# Patient Record
Sex: FEMALE | Race: BLACK OR AFRICAN AMERICAN | Employment: UNEMPLOYED | URBAN - METROPOLITAN AREA
[De-identification: names, ages, dates, MRNs, and addresses within clinical notes are randomized per-mention and may not be internally consistent; named-entity substitution may affect disease eponyms.]

---

## 2024-10-24 ENCOUNTER — APPOINTMENT (OUTPATIENT)
Dept: CT IMAGING | Facility: HOSPITAL | Age: 29
End: 2024-10-24
Attending: EMERGENCY MEDICINE

## 2024-10-24 ENCOUNTER — HOSPITAL ENCOUNTER (EMERGENCY)
Facility: HOSPITAL | Age: 29
Discharge: HOME | End: 2024-10-24
Attending: EMERGENCY MEDICINE

## 2024-10-24 ENCOUNTER — TELEPHONE (OUTPATIENT)
Dept: NEUROLOGY | Facility: CLINIC | Age: 29
End: 2024-10-24

## 2024-10-24 VITALS
RESPIRATION RATE: 17 BRPM | BODY MASS INDEX: 36.8 KG/M2 | HEART RATE: 69 BPM | TEMPERATURE: 98 F | HEIGHT: 62 IN | DIASTOLIC BLOOD PRESSURE: 73 MMHG | WEIGHT: 200 LBS | SYSTOLIC BLOOD PRESSURE: 108 MMHG | OXYGEN SATURATION: 99 %

## 2024-10-24 DIAGNOSIS — G40.909 SEIZURE DISORDER (HCC): Primary | ICD-10-CM

## 2024-10-24 LAB
ALBUMIN SERPL-MCNC: 5.1 G/DL (ref 3.2–4.8)
ALBUMIN/GLOB SERPL: 1.8 {RATIO} (ref 1–2)
ALP LIVER SERPL-CCNC: 75 U/L
ALT SERPL-CCNC: 16 U/L
ANION GAP SERPL CALC-SCNC: 11 MMOL/L (ref 0–18)
AST SERPL-CCNC: 16 U/L (ref ?–34)
BASOPHILS # BLD AUTO: 0.03 X10(3) UL (ref 0–0.2)
BASOPHILS NFR BLD AUTO: 0.4 %
BILIRUB SERPL-MCNC: 0.7 MG/DL (ref 0.3–1.2)
BUN BLD-MCNC: 13 MG/DL (ref 9–23)
CALCIUM BLD-MCNC: 9.7 MG/DL (ref 8.7–10.4)
CHLORIDE SERPL-SCNC: 107 MMOL/L (ref 98–112)
CO2 SERPL-SCNC: 23 MMOL/L (ref 21–32)
CREAT BLD-MCNC: 0.88 MG/DL
EGFRCR SERPLBLD CKD-EPI 2021: 91 ML/MIN/1.73M2 (ref 60–?)
EOSINOPHIL # BLD AUTO: 0.07 X10(3) UL (ref 0–0.7)
EOSINOPHIL NFR BLD AUTO: 1 %
ERYTHROCYTE [DISTWIDTH] IN BLOOD BY AUTOMATED COUNT: 11.9 %
GLOBULIN PLAS-MCNC: 2.8 G/DL (ref 2–3.5)
GLUCOSE BLD-MCNC: 102 MG/DL (ref 70–99)
HCT VFR BLD AUTO: 42.5 %
HGB BLD-MCNC: 14.2 G/DL
IMM GRANULOCYTES # BLD AUTO: 0.01 X10(3) UL (ref 0–1)
IMM GRANULOCYTES NFR BLD: 0.1 %
LYMPHOCYTES # BLD AUTO: 2.55 X10(3) UL (ref 1–4)
LYMPHOCYTES NFR BLD AUTO: 36.4 %
MCH RBC QN AUTO: 30.1 PG (ref 26–34)
MCHC RBC AUTO-ENTMCNC: 33.4 G/DL (ref 31–37)
MCV RBC AUTO: 90 FL
MONOCYTES # BLD AUTO: 0.4 X10(3) UL (ref 0.1–1)
MONOCYTES NFR BLD AUTO: 5.7 %
NEUTROPHILS # BLD AUTO: 3.94 X10 (3) UL (ref 1.5–7.7)
NEUTROPHILS # BLD AUTO: 3.94 X10(3) UL (ref 1.5–7.7)
NEUTROPHILS NFR BLD AUTO: 56.4 %
OSMOLALITY SERPL CALC.SUM OF ELEC: 292 MOSM/KG (ref 275–295)
PLATELET # BLD AUTO: 254 10(3)UL (ref 150–450)
POTASSIUM SERPL-SCNC: 3.9 MMOL/L (ref 3.5–5.1)
PROT SERPL-MCNC: 7.9 G/DL (ref 5.7–8.2)
RBC # BLD AUTO: 4.72 X10(6)UL
SODIUM SERPL-SCNC: 141 MMOL/L (ref 136–145)
WBC # BLD AUTO: 7 X10(3) UL (ref 4–11)

## 2024-10-24 PROCEDURE — 70450 CT HEAD/BRAIN W/O DYE: CPT | Performed by: EMERGENCY MEDICINE

## 2024-10-24 PROCEDURE — 99285 EMERGENCY DEPT VISIT HI MDM: CPT

## 2024-10-24 PROCEDURE — 80053 COMPREHEN METABOLIC PANEL: CPT | Performed by: EMERGENCY MEDICINE

## 2024-10-24 PROCEDURE — 85025 COMPLETE CBC W/AUTO DIFF WBC: CPT | Performed by: EMERGENCY MEDICINE

## 2024-10-24 PROCEDURE — 99284 EMERGENCY DEPT VISIT MOD MDM: CPT

## 2024-10-24 PROCEDURE — 96374 THER/PROPH/DIAG INJ IV PUSH: CPT

## 2024-10-24 PROCEDURE — 96375 TX/PRO/DX INJ NEW DRUG ADDON: CPT

## 2024-10-24 RX ORDER — LEVETIRACETAM 500 MG/1
500 TABLET ORAL 2 TIMES DAILY
Qty: 60 TABLET | Refills: 0 | Status: SHIPPED | OUTPATIENT
Start: 2024-10-24 | End: 2024-11-23

## 2024-10-24 RX ORDER — LORAZEPAM 2 MG/ML
1 INJECTION INTRAMUSCULAR ONCE
Status: COMPLETED | OUTPATIENT
Start: 2024-10-24 | End: 2024-10-24

## 2024-10-24 RX ORDER — LEVETIRACETAM 500 MG/5ML
1000 INJECTION, SOLUTION, CONCENTRATE INTRAVENOUS ONCE
Status: COMPLETED | OUTPATIENT
Start: 2024-10-24 | End: 2024-10-24

## 2024-10-24 NOTE — TELEPHONE ENCOUNTER
SEIZURE CLINIC SCREENING    1.         Date of ED discharge:  10/24/2024    2.         Is patient currently admitted?  No              (if YES - Seizure Clinic Appointment not required.  Patient should follow usual non-urgent scheduling procedures to see neurology upon discharge from hospital.)    2.         Does the patient already have a non-[facility] neurologist (seen in past 3 years, check care everywhere and ask the patient)?  No  Name:                (if YES - Seizure Clinic Appointment not required.  Patient should be advised to contact their neurologist.)    3.         Does patient already see an [facility] neurologist (seen in past 3 years)?  No  Name:                (if YES - Seizure Clinic Appointment not required.  Route message on to patient's [facility] neurologist.)    4.         Is Seizure Clinic Appointment indicated?  Yes    If YES - route encounter to [facility] to schedule patient for clinic appointment, new patient visit, NO LATER THAN 72 HOURS AFTER DISCHARGE.

## 2024-10-24 NOTE — ED QUICK NOTES
Pt's side rails padded for precaution, pt awake and alert. A little slow to answer questions. coherent   retired

## 2024-10-24 NOTE — ED QUICK NOTES
Upon discharge, pt's mother requested a ct braintesting, dr. Renee at bedside  and ordered the test

## 2024-10-24 NOTE — ED INITIAL ASSESSMENT (HPI)
Pt with hx of seizures per medics, witnessed by mother. Per medics pt had a seizure lasted for 2 minutes per mother. Pt was in her bed during seizure activitiy. Per pt she last took her seizure meds a year ago

## 2024-10-24 NOTE — ED QUICK NOTES
Pt reevaluated by dr. Renee, pt and mother both informed of her test report and plan of care. Verbalizing understanding

## 2024-10-24 NOTE — ED QUICK NOTES
Mother at bedside. Pt states that she feels like she'll have another seizures. Dr. Renee made aware

## 2024-10-24 NOTE — ED PROVIDER NOTES
Patient Seen in: [facility] Emergency Department      History     Chief Complaint   Patient presents with    Seizure Disorder     Stated Complaint: SEIZURE    Subjective:   HPI      29-year-old female with history of epilepsy presents after a witnessed seizure at home.  Paramedics report that mom witnessed the seizure and said it lasted about 2 minutes.  Paramedics state the patient recently relocated to this area.  The patient tells me that she has not taken her medication about a year.  She used to take Keppra but she is uncertain of what dosage.  She does not follow with neurology any longer.  Previously treated in [place].  Denies any injuries    Objective:     Past Medical History:    Seizure disorder (HCC)              History reviewed. No pertinent surgical history.             No pertinent social history.                Physical Exam     ED Triage Vitals [10/24/24 0733]   /67   Pulse 102   Resp 20   Temp 98 °F (36.7 °C)   Temp src Temporal   SpO2 99 %   O2 Device None (Room air)       Current Vitals:   Vital Signs  BP: 114/60  Pulse: 73  Resp: 20  Temp: 98 °F (36.7 °C)  Temp src: Temporal  MAP (mmHg): 86    Oxygen Therapy  SpO2: 98 %  O2 Device: None (Room air)        Physical Exam  General:  Vitals as listed.  No acute distress   HEENT:  puncture wound to the left front tongue region.  No laceration.  No oral bleeding.  Neck: Full range of motion without discomfort  Lungs: good air exchange and clear   Heart: regular rate rhythm and no murmur   Abdomen: Soft and nontender.  No abdominal masses.  No peritoneal signs   Extremities: no edema, normal peripheral pulses   Neuro: Alert oriented and nonfocal       ED Course     Labs Reviewed   COMP METABOLIC PANEL (14) - Abnormal; Notable for the following components:       Result Value    Glucose 102 (*)     Albumin 5.1 (*)     All other components within normal limits   CBC WITH DIFFERENTIAL WITH PLATELET   RAINBOW DRAW LAVENDER   RAINBOW DRAW LIGHT  GREEN   RAINBOW DRAW BLUE   RAINBOW DRAW GOLD          CT BRAIN OR HEAD (CPT=70450)    Result Date: 10/24/2024  PROCEDURE:  CT BRAIN OR HEAD (63935)  COMPARISON:  None.  INDICATIONS:  SEIZURE  TECHNIQUE:  Noncontrast CT scanning is performed through the brain. Dose reduction techniques were used. Dose information is transmitted to the ACR (American College of Radiology) NRDR (National Radiology Data Registry) which includes the Dose Index Registry.  PATIENT STATED HISTORY: (As transcribed by Technologist)  Patient stated she had a seziure today.    FINDINGS:  VENTRICLES/SULCI:  Ventricles and sulci are normal in size.  INTRACRANIAL:  There is asymmetry of the cerebellum with the right cerebellum being hypoplastic.  There are no abnormal extraaxial fluid collections.  There is no midline shift.  There are no intraparenchymal brain abnormalities.  There is nothing specific for acute infarct.  There is no hemorrhage or mass lesion.  SINUSES:           No sign of acute sinusitis.  MASTOIDS:          No sign of acute inflammation. SKULL:             No evidence for fracture or osseous abnormality. OTHER:             None.            CONCLUSION:   1.  No acute intracranial process.   2.  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  Given the patient's history of seizure, further evaluation could be performed using seizure protocol MRI.    LOCATION:  [facility]   Dictated by (CST): Magno Cruz MD on 10/24/2024 at 12:32 PM     Finalized by (CST): Magno Cruz MD on 10/24/2024 at 12:34 PM               MDM      29-year-old female presents after a witnessed seizure at home.  Previously on Keppra (unknown dose) small wound to the left side of the tongue.  No bleeding    Additional history obtained by patient's mom who is now at the bedside and reports that the patient also had a seizure last night.  She last took Keppra about a year ago and was on 250 mg twice daily.  Prior to that she had been on  Tegretol (?) and was too fatigued on that so she was switched to Keppra and ultimately titrated from 500 mg to 250 mg due to side effects of fatigue    Differential includes but is not limited to tongue injury, seizure, epilepsy, a life threat.    CBC, CMP ordered for further evaluation.    Minor visual interpretation of CT the brain is that there is no acute intracranial hemorrhage.    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    Discussed the patient's workup with neurology who would like to follow-up in clinic.  Outpatient MRI ordered per seizure clinic follow-up protocol    No significant abnormalities on laboratory evaluation.  Patient began reporting she felt like she might have another seizure so Ativan 1 mg IV was given.  She then slept for a while.  Mom showed up and states the patient was most recently on Keppra 250 mg twice daily due to being very tired on higher doses.  I discussed this case with Dr. Kendrick from neurology who recommends a 1 g Keppra IV bolus and that the patient be started on 500 mg twice daily of Keppra and follow-up in the epilepsy clinic.  Patient okay for discharge home at this time.  Will send prescription for oral medication to pharmacy and instructed to call to set up management with neurology.            Medical Decision Making      Disposition and Plan     Clinical Impression:  1. Seizure disorder (HCC)         Disposition:  Discharge  10/24/2024 11:00 am    Follow-up:  [address]  [address] [address] [phone]  Call   An EEG and MRI have been ordered. Call office and choose option 1 for general neurology and state that you are following up for Seizure          Medications Prescribed:  Current Discharge Medication List        START taking these medications    Details   levETIRAcetam 500 MG Oral Tab Take 1 tablet (500 mg total) by mouth 2 (two) times  daily.  Qty: 60 tablet, Refills: 0                 Supplementary Documentation:

## 2024-10-24 NOTE — TELEPHONE ENCOUNTER
Contacted patient and scheduled first available seizure clinic spot that worked for her. Please let us know if he would like to see her sooner.    Future Appointments   Date Time Provider Department Center   11/13/2024  9:00 AM Bonnie Nelson MD [facility]

## 2024-11-12 NOTE — TELEPHONE ENCOUNTER
Patients mother calling to request a different appt time if possible. Patient was scheduled for seizure clinic spot tomorrow 11/13 and her mom has a biopsy at the same time. Pulled up  schedule and the next slot is in January- is there a different slot we can offer if he would like to see her sooner?

## 2024-11-26 ENCOUNTER — NURSE TRIAGE (OUTPATIENT)
Dept: TELEHEALTH | Age: 29
End: 2024-11-26

## 2024-11-27 NOTE — TELEPHONE ENCOUNTER
Third attempt to contact patient. Lvm to call back if still interested in booking an appointment.

## 2024-11-29 ENCOUNTER — OFFICE VISIT (OUTPATIENT)
Dept: INTERNAL MEDICINE | Age: 29
End: 2024-11-29

## 2024-11-29 VITALS
HEIGHT: 62 IN | SYSTOLIC BLOOD PRESSURE: 121 MMHG | DIASTOLIC BLOOD PRESSURE: 68 MMHG | BODY MASS INDEX: 36.55 KG/M2 | HEART RATE: 76 BPM | WEIGHT: 198.6 LBS | RESPIRATION RATE: 16 BRPM

## 2024-11-29 DIAGNOSIS — G40.909 SEIZURE DISORDER  (CMD): Primary | ICD-10-CM

## 2024-11-29 DIAGNOSIS — I83.813 VARICOSE VEINS OF BOTH LOWER EXTREMITIES WITH PAIN: ICD-10-CM

## 2024-11-29 DIAGNOSIS — E28.2 PCOS (POLYCYSTIC OVARIAN SYNDROME): ICD-10-CM

## 2024-11-29 RX ORDER — LEVETIRACETAM 500 MG/1
500 TABLET ORAL 2 TIMES DAILY
COMMUNITY
Start: 2024-11-26

## 2024-11-29 ASSESSMENT — ENCOUNTER SYMPTOMS
DIZZINESS: 0
SHORTNESS OF BREATH: 0
COUGH: 0
CHILLS: 0
ACTIVITY CHANGE: 0
HEADACHES: 0
FEVER: 0
SLEEP DISTURBANCE: 0
APPETITE CHANGE: 0
UNEXPECTED WEIGHT CHANGE: 0
NERVOUS/ANXIOUS: 0
FATIGUE: 0
DIAPHORESIS: 0

## 2024-11-29 ASSESSMENT — PATIENT HEALTH QUESTIONNAIRE - PHQ9
2. FEELING DOWN, DEPRESSED OR HOPELESS: MORE THAN HALF THE DAYS
8. MOVING OR SPEAKING SO SLOWLY THAT OTHER PEOPLE COULD HAVE NOTICED. OR THE OPPOSITE, BEING SO FIGETY OR RESTLESS THAT YOU HAVE BEEN MOVING AROUND A LOT MORE THAN USUAL: MORE THAN HALF THE DAYS
9. THOUGHTS THAT YOU WOULD BE BETTER OFF DEAD, OR OF HURTING YOURSELF: NOT AT ALL
1. LITTLE INTEREST OR PLEASURE IN DOING THINGS: MORE THAN HALF THE DAYS
7. TROUBLE CONCENTRATING ON THINGS, SUCH AS READING THE NEWSPAPER OR WATCHING TELEVISION: MORE THAN HALF THE DAYS
3. TROUBLE FALLING OR STAYING ASLEEP OR SLEEPING TOO MUCH: MORE THAN HALF THE DAYS
6. FEELING BAD ABOUT YOURSELF - OR THAT YOU ARE A FAILURE OR HAVE LET YOURSELF OR YOUR FAMILY DOWN: NOT AT ALL
SUM OF ALL RESPONSES TO PHQ9 QUESTIONS 1 AND 2: 4
SUM OF ALL RESPONSES TO PHQ9 QUESTIONS 1 AND 2: 4
CLINICAL INTERPRETATION OF PHQ2 SCORE: FURTHER SCREENING NEEDED
5. POOR APPETITE, WEIGHT LOSS, OR OVEREATING: MORE THAN HALF THE DAYS
4. FEELING TIRED OR HAVING LITTLE ENERGY: MORE THAN HALF THE DAYS
SUM OF ALL RESPONSES TO PHQ QUESTIONS 1-9: 14
CLINICAL INTERPRETATION OF PHQ9 SCORE: MODERATE DEPRESSION
10. IF YOU CHECKED OFF ANY PROBLEMS, HOW DIFFICULT HAVE THESE PROBLEMS MADE IT FOR YOU TO DO YOUR WORK, TAKE CARE OF THINGS AT HOME, OR GET ALONG WITH OTHER PEOPLE: SOMEWHAT DIFFICULT

## 2024-12-05 LAB
CYTOLOGY CVX/VAG DOC THIN PREP: NORMAL
HPV16+18+45 E6+E7MRNA CVX NAA+PROBE: NEGATIVE

## 2024-12-23 RX ORDER — LEVETIRACETAM 500 MG/1
500 TABLET ORAL 2 TIMES DAILY
Qty: 60 TABLET | Refills: 0 | Status: SHIPPED | OUTPATIENT
Start: 2024-12-23

## 2024-12-23 RX ORDER — LEVETIRACETAM 500 MG/1
500 TABLET ORAL 2 TIMES DAILY
Qty: 60 TABLET | Refills: 0 | Status: CANCELLED | OUTPATIENT
Start: 2024-12-23

## 2025-01-20 RX ORDER — LEVETIRACETAM 500 MG/1
500 TABLET ORAL 2 TIMES DAILY
Qty: 60 TABLET | Refills: 0 | Status: SHIPPED | OUTPATIENT
Start: 2025-01-20

## 2025-01-22 ENCOUNTER — TELEPHONE (OUTPATIENT)
Dept: NEUROLOGY | Age: 30
End: 2025-01-22

## 2025-01-29 ENCOUNTER — APPOINTMENT (OUTPATIENT)
Dept: NEUROLOGY | Age: 30
End: 2025-01-29

## 2025-01-29 VITALS
WEIGHT: 201 LBS | HEIGHT: 62 IN | OXYGEN SATURATION: 97 % | DIASTOLIC BLOOD PRESSURE: 73 MMHG | SYSTOLIC BLOOD PRESSURE: 114 MMHG | BODY MASS INDEX: 36.99 KG/M2 | HEART RATE: 71 BPM

## 2025-01-29 DIAGNOSIS — F44.9 CONVERSION DISORDER: ICD-10-CM

## 2025-01-29 DIAGNOSIS — G47.30 SLEEP APNEA, UNSPECIFIED TYPE: ICD-10-CM

## 2025-01-29 DIAGNOSIS — G40.209 FOCAL EPILEPSY WITH IMPAIRMENT OF CONSCIOUSNESS  (CMD): Primary | ICD-10-CM

## 2025-01-29 DIAGNOSIS — F41.9 ANXIETY: ICD-10-CM

## 2025-01-29 PROCEDURE — 99205 OFFICE O/P NEW HI 60 MIN: CPT | Performed by: STUDENT IN AN ORGANIZED HEALTH CARE EDUCATION/TRAINING PROGRAM

## 2025-01-29 RX ORDER — LEVETIRACETAM 500 MG/1
500 TABLET ORAL 2 TIMES DAILY
Qty: 180 TABLET | Refills: 1 | Status: SHIPPED | OUTPATIENT
Start: 2025-01-29

## 2025-02-04 ENCOUNTER — E-ADVICE (OUTPATIENT)
Dept: SLEEP MEDICINE | Age: 30
End: 2025-02-04

## 2025-03-03 ENCOUNTER — TELEPHONE (OUTPATIENT)
Age: 30
End: 2025-03-03

## 2025-03-17 ENCOUNTER — CLINICAL ABSTRACT (OUTPATIENT)
Dept: OTHER | Age: 30
End: 2025-03-17

## 2025-04-23 ENCOUNTER — TELEPHONE (OUTPATIENT)
Dept: NEUROLOGY | Age: 30
End: 2025-04-23

## 2025-05-02 ENCOUNTER — TELEPHONE (OUTPATIENT)
Dept: NEUROLOGY | Age: 30
End: 2025-05-02

## 2025-05-02 ENCOUNTER — E-ADVICE (OUTPATIENT)
Dept: NEUROLOGY | Age: 30
End: 2025-05-02

## 2025-05-08 ENCOUNTER — APPOINTMENT (OUTPATIENT)
Dept: NEUROLOGY | Age: 30
End: 2025-05-08

## 2025-05-08 VITALS
BODY MASS INDEX: 36.99 KG/M2 | OXYGEN SATURATION: 98 % | HEIGHT: 62 IN | SYSTOLIC BLOOD PRESSURE: 112 MMHG | HEART RATE: 63 BPM | WEIGHT: 201 LBS | DIASTOLIC BLOOD PRESSURE: 79 MMHG

## 2025-05-08 DIAGNOSIS — F41.9 ANXIETY: ICD-10-CM

## 2025-05-08 DIAGNOSIS — F44.9 CONVERSION DISORDER: ICD-10-CM

## 2025-05-08 DIAGNOSIS — T88.7XXA MEDICATION SIDE EFFECTS: ICD-10-CM

## 2025-05-08 DIAGNOSIS — G47.30 SLEEP APNEA, UNSPECIFIED TYPE: ICD-10-CM

## 2025-05-08 DIAGNOSIS — G40.209 FOCAL EPILEPSY WITH IMPAIRMENT OF CONSCIOUSNESS (CMD): Primary | ICD-10-CM

## 2025-05-08 PROCEDURE — 99214 OFFICE O/P EST MOD 30 MIN: CPT | Performed by: STUDENT IN AN ORGANIZED HEALTH CARE EDUCATION/TRAINING PROGRAM

## 2025-05-08 RX ORDER — LEVETIRACETAM 500 MG/1
500 TABLET ORAL 2 TIMES DAILY
Qty: 180 TABLET | Refills: 1 | Status: SHIPPED | OUTPATIENT
Start: 2025-05-08

## 2025-05-08 RX ORDER — MULTIVITAMIN WITH IRON
100 TABLET ORAL DAILY
Qty: 30 TABLET | Refills: 3 | Status: SHIPPED | OUTPATIENT
Start: 2025-05-08

## 2025-05-08 ASSESSMENT — ANXIETY QUESTIONNAIRES
3. WORRYING TOO MUCH ABOUT DIFFERENT THINGS: 2
5. BEING SO RESTLESS THAT IT IS HARD TO SIT STILL: SEVERAL DAYS
1. FEELING NERVOUS, ANXIOUS, OR ON EDGE: SEVERAL DAYS
4. TROUBLE RELAXING: 2
6. BECOMING EASILY ANNOYED OR IRRITABLE: 3
GAD7 TOTAL SCORE: 10
7. FEELING AFRAID AS IF SOMETHING AWFUL MIGHT HAPPEN: 0
1. FEELING NERVOUS, ANXIOUS, OR ON EDGE: 1
5. BEING SO RESTLESS THAT IT IS HARD TO SIT STILL: 1
3. WORRYING TOO MUCH ABOUT DIFFERENT THINGS: MORE THAN HALF THE DAYS
6. BECOMING EASILY ANNOYED OR IRRITABLE: NEARLY EVERY DAY
7. FEELING AFRAID AS IF SOMETHING AWFUL MIGHT HAPPEN: NOT AT ALL
2. NOT BEING ABLE TO STOP OR CONTROL WORRYING: SEVERAL DAYS
4. TROUBLE RELAXING: MORE THAN HALF THE DAYS
2. NOT BEING ABLE TO STOP OR CONTROL WORRYING: 1
IF YOU CHECKED OFF ANY PROBLEMS ON THIS QUESTIONNAIRE, HOW DIFFICULT HAVE THESE PROBLEMS MADE IT FOR YOU TO DO YOUR WORK, TAKE CARE OF THINGS AT HOME, OR GET ALONG WITH OTHER PEOPLE: NOT DIFFICULT AT ALL

## 2025-05-08 ASSESSMENT — PAIN SCALES - GENERAL: PAINLEVEL_OUTOF10: 0

## 2025-07-28 ENCOUNTER — TELEPHONE (OUTPATIENT)
Dept: NEUROLOGY | Age: 30
End: 2025-07-28

## 2025-07-30 ENCOUNTER — E-ADVICE (OUTPATIENT)
Dept: INTERNAL MEDICINE | Age: 30
End: 2025-07-30

## 2025-08-13 ENCOUNTER — APPOINTMENT (OUTPATIENT)
Dept: NEUROLOGY | Age: 30
End: 2025-08-13

## 2025-08-28 ENCOUNTER — APPOINTMENT (OUTPATIENT)
Dept: FAMILY MEDICINE | Age: 30
End: 2025-08-28

## 2025-08-28 VITALS
TEMPERATURE: 97.2 F | BODY MASS INDEX: 35.54 KG/M2 | SYSTOLIC BLOOD PRESSURE: 110 MMHG | RESPIRATION RATE: 16 BRPM | HEART RATE: 68 BPM | DIASTOLIC BLOOD PRESSURE: 64 MMHG | OXYGEN SATURATION: 99 % | WEIGHT: 193.12 LBS | HEIGHT: 62 IN

## 2025-08-28 DIAGNOSIS — F41.1 GAD (GENERALIZED ANXIETY DISORDER): ICD-10-CM

## 2025-08-28 DIAGNOSIS — E55.9 VITAMIN D DEFICIENCY: ICD-10-CM

## 2025-08-28 DIAGNOSIS — E28.2 PCOS (POLYCYSTIC OVARIAN SYNDROME): ICD-10-CM

## 2025-08-28 DIAGNOSIS — G40.909 SEIZURE DISORDER  (CMD): ICD-10-CM

## 2025-08-28 DIAGNOSIS — Z00.00 ANNUAL PHYSICAL EXAM: Primary | ICD-10-CM

## 2025-08-28 PROCEDURE — 99385 PREV VISIT NEW AGE 18-39: CPT | Performed by: FAMILY MEDICINE

## 2025-08-28 SDOH — HEALTH STABILITY: MENTAL HEALTH
STRESS IS WHEN SOMEONE FEELS TENSE, NERVOUS, ANXIOUS, OR CAN'T SLEEP AT NIGHT BECAUSE THEIR MIND IS TROUBLED. HOW STRESSED ARE YOU?: A LITTLE BIT

## 2025-08-28 SDOH — HEALTH STABILITY: MENTAL HEALTH: HOW OFTEN DO YOU HAVE A DRINK CONTAINING ALCOHOL?: NEVER

## 2025-08-28 SDOH — HEALTH STABILITY: MENTAL HEALTH: HOW MANY STANDARD DRINKS CONTAINING ALCOHOL DO YOU HAVE ON A TYPICAL DAY?: 0,1 OR 2

## 2025-08-28 SDOH — HEALTH STABILITY: MENTAL HEALTH: HOW OFTEN DO YOU HAVE 6 OR MORE DRINKS ON ONE OCCASION?: NEVER

## 2025-08-28 SDOH — HEALTH STABILITY: MENTAL HEALTH: AUDIT TOTAL SCORE: 0

## 2025-08-28 ASSESSMENT — PATIENT HEALTH QUESTIONNAIRE - PHQ9
SUM OF ALL RESPONSES TO PHQ9 QUESTIONS 1 AND 2: 0
1. LITTLE INTEREST OR PLEASURE IN DOING THINGS: NOT AT ALL
2. FEELING DOWN, DEPRESSED OR HOPELESS: NOT AT ALL
SUM OF ALL RESPONSES TO PHQ9 QUESTIONS 1 AND 2: 0

## 2025-08-28 ASSESSMENT — PAIN SCALES - GENERAL: PAINLEVEL_OUTOF10: 0

## 2025-08-29 ENCOUNTER — TELEPHONE (OUTPATIENT)
Dept: BEHAVIORAL HEALTH | Age: 30
End: 2025-08-29

## 2025-09-04 ENCOUNTER — E-ADVICE (OUTPATIENT)
Dept: FAMILY MEDICINE | Age: 30
End: 2025-09-04

## 2025-09-09 ENCOUNTER — APPOINTMENT (OUTPATIENT)
Dept: FAMILY MEDICINE | Age: 30
End: 2025-09-09

## 2025-09-29 ENCOUNTER — APPOINTMENT (OUTPATIENT)
Dept: OBGYN | Age: 30
End: 2025-09-29